# Patient Record
Sex: FEMALE | Race: WHITE | Employment: FULL TIME | URBAN - METROPOLITAN AREA
[De-identification: names, ages, dates, MRNs, and addresses within clinical notes are randomized per-mention and may not be internally consistent; named-entity substitution may affect disease eponyms.]

---

## 2018-09-06 PROBLEM — N76.0 BACTERIAL VAGINAL INFECTION: Status: ACTIVE | Noted: 2018-09-06

## 2018-09-06 PROBLEM — B96.89 BACTERIAL VAGINAL INFECTION: Status: ACTIVE | Noted: 2018-09-06

## 2018-09-06 PROBLEM — R35.0 URINARY FREQUENCY: Status: ACTIVE | Noted: 2018-09-06

## 2018-09-06 PROBLEM — N89.8 VAGINAL ITCHING: Status: ACTIVE | Noted: 2018-09-06

## 2019-04-03 PROBLEM — Z30.09 COUNSELING FOR BIRTH CONTROL REGARDING INTRAUTERINE DEVICE (IUD): Status: ACTIVE | Noted: 2019-04-03

## 2019-05-03 PROBLEM — Z30.430 ENCOUNTER FOR INSERTION OF COPPER IUD: Status: ACTIVE | Noted: 2019-05-03

## 2021-06-04 PROBLEM — N89.8 VAGINAL ITCHING: Status: RESOLVED | Noted: 2018-09-06 | Resolved: 2021-06-04

## 2021-06-04 PROBLEM — Z34.90 PREGNANCY: Status: ACTIVE | Noted: 2021-06-04

## 2021-06-04 PROBLEM — N76.0 BACTERIAL VAGINAL INFECTION: Status: RESOLVED | Noted: 2018-09-06 | Resolved: 2021-06-04

## 2021-06-04 PROBLEM — R35.0 URINARY FREQUENCY: Status: RESOLVED | Noted: 2018-09-06 | Resolved: 2021-06-04

## 2021-06-04 PROBLEM — Z30.430 ENCOUNTER FOR INSERTION OF COPPER IUD: Status: RESOLVED | Noted: 2019-05-03 | Resolved: 2021-06-04

## 2021-06-04 PROBLEM — B96.89 BACTERIAL VAGINAL INFECTION: Status: RESOLVED | Noted: 2018-09-06 | Resolved: 2021-06-04

## 2021-06-04 PROBLEM — Z30.09 COUNSELING FOR BIRTH CONTROL REGARDING INTRAUTERINE DEVICE (IUD): Status: RESOLVED | Noted: 2019-04-03 | Resolved: 2021-06-04

## 2021-12-27 ENCOUNTER — HOSPITAL ENCOUNTER (INPATIENT)
Age: 31
LOS: 3 days | Discharge: HOME | End: 2021-12-30
Attending: OBSTETRICS & GYNECOLOGY | Admitting: OBSTETRICS & GYNECOLOGY
Payer: COMMERCIAL

## 2021-12-27 DIAGNOSIS — U07.1 COVID-19: ICD-10-CM

## 2021-12-27 PROBLEM — Z34.90 ENCOUNTER FOR INDUCTION OF LABOR: Status: ACTIVE | Noted: 2021-12-27

## 2021-12-27 LAB
ERYTHROCYTE [DISTWIDTH] IN BLOOD BY AUTOMATED COUNT: 12.3 % (ref 11.9–14.6)
HCT VFR BLD AUTO: 36.4 % (ref 35.8–46.3)
HGB BLD-MCNC: 12.4 G/DL (ref 11.7–15.4)
MCH RBC QN AUTO: 31.8 PG (ref 26.1–32.9)
MCHC RBC AUTO-ENTMCNC: 34.1 G/DL (ref 31.4–35)
MCV RBC AUTO: 93.3 FL (ref 79.6–97.8)
NRBC # BLD: 0 K/UL (ref 0–0.2)
PLATELET # BLD AUTO: 222 K/UL (ref 150–450)
PMV BLD AUTO: 9.8 FL (ref 9.4–12.3)
RBC # BLD AUTO: 3.9 M/UL (ref 4.05–5.2)
WBC # BLD AUTO: 6.1 K/UL (ref 4.3–11.1)

## 2021-12-27 PROCEDURE — 85027 COMPLETE CBC AUTOMATED: CPT

## 2021-12-27 PROCEDURE — 65270000029 HC RM PRIVATE

## 2021-12-27 PROCEDURE — 74011250636 HC RX REV CODE- 250/636: Performed by: OBSTETRICS & GYNECOLOGY

## 2021-12-27 PROCEDURE — 36415 COLL VENOUS BLD VENIPUNCTURE: CPT

## 2021-12-27 PROCEDURE — 86900 BLOOD TYPING SEROLOGIC ABO: CPT

## 2021-12-27 RX ORDER — LIDOCAINE HYDROCHLORIDE 20 MG/ML
JELLY TOPICAL
Status: ACTIVE | OUTPATIENT
Start: 2021-12-27 | End: 2021-12-28

## 2021-12-27 RX ORDER — SODIUM CHLORIDE 0.9 % (FLUSH) 0.9 %
5-40 SYRINGE (ML) INJECTION AS NEEDED
Status: DISCONTINUED | OUTPATIENT
Start: 2021-12-27 | End: 2021-12-29

## 2021-12-27 RX ORDER — ACETAMINOPHEN 500 MG
1000 TABLET ORAL
Status: DISCONTINUED | OUTPATIENT
Start: 2021-12-27 | End: 2021-12-29

## 2021-12-27 RX ORDER — OXYTOCIN/RINGER'S LACTATE 30/500 ML
87.3 PLASTIC BAG, INJECTION (ML) INTRAVENOUS AS NEEDED
Status: COMPLETED | OUTPATIENT
Start: 2021-12-27 | End: 2021-12-29

## 2021-12-27 RX ORDER — OXYTOCIN/RINGER'S LACTATE 30/500 ML
10 PLASTIC BAG, INJECTION (ML) INTRAVENOUS AS NEEDED
Status: DISCONTINUED | OUTPATIENT
Start: 2021-12-27 | End: 2021-12-29

## 2021-12-27 RX ORDER — SODIUM CHLORIDE 0.9 % (FLUSH) 0.9 %
5-40 SYRINGE (ML) INJECTION EVERY 8 HOURS
Status: DISCONTINUED | OUTPATIENT
Start: 2021-12-27 | End: 2021-12-29

## 2021-12-27 RX ORDER — ZOLPIDEM TARTRATE 5 MG/1
5 TABLET ORAL
Status: DISCONTINUED | OUTPATIENT
Start: 2021-12-27 | End: 2021-12-29

## 2021-12-27 RX ORDER — LIDOCAINE HYDROCHLORIDE 10 MG/ML
1 INJECTION INFILTRATION; PERINEURAL
Status: ACTIVE | OUTPATIENT
Start: 2021-12-27 | End: 2021-12-28

## 2021-12-27 RX ORDER — DIPHENHYDRAMINE HCL 25 MG
25 CAPSULE ORAL
Status: DISCONTINUED | OUTPATIENT
Start: 2021-12-27 | End: 2021-12-29

## 2021-12-27 RX ORDER — CALCIUM CARBONATE 200(500)MG
200 TABLET,CHEWABLE ORAL
Status: DISCONTINUED | OUTPATIENT
Start: 2021-12-27 | End: 2021-12-29

## 2021-12-27 RX ORDER — DEXTROSE, SODIUM CHLORIDE, SODIUM LACTATE, POTASSIUM CHLORIDE, AND CALCIUM CHLORIDE 5; .6; .31; .03; .02 G/100ML; G/100ML; G/100ML; G/100ML; G/100ML
125 INJECTION, SOLUTION INTRAVENOUS CONTINUOUS
Status: DISCONTINUED | OUTPATIENT
Start: 2021-12-27 | End: 2021-12-29

## 2021-12-27 RX ORDER — BUTORPHANOL TARTRATE 2 MG/ML
1 INJECTION INTRAMUSCULAR; INTRAVENOUS
Status: DISCONTINUED | OUTPATIENT
Start: 2021-12-27 | End: 2021-12-29

## 2021-12-27 RX ORDER — MINERAL OIL
120 OIL (ML) ORAL
Status: DISPENSED | OUTPATIENT
Start: 2021-12-27 | End: 2021-12-28

## 2021-12-27 RX ORDER — ONDANSETRON 2 MG/ML
4 INJECTION INTRAMUSCULAR; INTRAVENOUS
Status: DISCONTINUED | OUTPATIENT
Start: 2021-12-27 | End: 2021-12-29

## 2021-12-27 RX ADMIN — SODIUM CHLORIDE, SODIUM LACTATE, POTASSIUM CHLORIDE, CALCIUM CHLORIDE, AND DEXTROSE MONOHYDRATE 125 ML/HR: 600; 310; 30; 20; 5 INJECTION, SOLUTION INTRAVENOUS at 22:18

## 2021-12-28 ENCOUNTER — ANESTHESIA (OUTPATIENT)
Dept: ANTEPARTUM | Age: 31
End: 2021-12-28
Payer: COMMERCIAL

## 2021-12-28 ENCOUNTER — ANESTHESIA EVENT (OUTPATIENT)
Dept: ANTEPARTUM | Age: 31
End: 2021-12-28
Payer: COMMERCIAL

## 2021-12-28 LAB
ABO + RH BLD: NORMAL
BLOOD GROUP ANTIBODIES SERPL: NORMAL
SPECIMEN EXP DATE BLD: NORMAL

## 2021-12-28 PROCEDURE — 2709999900 HC NON-CHARGEABLE SUPPLY

## 2021-12-28 PROCEDURE — 77030014125 HC TY EPDRL BBMI -B: Performed by: ANESTHESIOLOGY

## 2021-12-28 PROCEDURE — 76060000078 HC EPIDURAL ANESTHESIA

## 2021-12-28 PROCEDURE — 74011250636 HC RX REV CODE- 250/636: Performed by: OBSTETRICS & GYNECOLOGY

## 2021-12-28 PROCEDURE — 36415 COLL VENOUS BLD VENIPUNCTURE: CPT

## 2021-12-28 PROCEDURE — 74011250637 HC RX REV CODE- 250/637: Performed by: OBSTETRICS & GYNECOLOGY

## 2021-12-28 PROCEDURE — 74011250636 HC RX REV CODE- 250/636: Performed by: STUDENT IN AN ORGANIZED HEALTH CARE EDUCATION/TRAINING PROGRAM

## 2021-12-28 PROCEDURE — A4300 CATH IMPL VASC ACCESS PORTAL: HCPCS | Performed by: ANESTHESIOLOGY

## 2021-12-28 PROCEDURE — 77030007880 HC KT SPN EPDRL BBMI -B: Performed by: ANESTHESIOLOGY

## 2021-12-28 PROCEDURE — 65270000029 HC RM PRIVATE

## 2021-12-28 PROCEDURE — 3E0P7VZ INTRODUCTION OF HORMONE INTO FEMALE REPRODUCTIVE, VIA NATURAL OR ARTIFICIAL OPENING: ICD-10-PCS | Performed by: OBSTETRICS & GYNECOLOGY

## 2021-12-28 PROCEDURE — 77030019905 HC CATH URETH INTMIT MDII -A

## 2021-12-28 RX ORDER — FENTANYL CITRATE 50 UG/ML
100 INJECTION, SOLUTION INTRAMUSCULAR; INTRAVENOUS ONCE
Status: DISCONTINUED | OUTPATIENT
Start: 2021-12-28 | End: 2021-12-29

## 2021-12-28 RX ORDER — OXYTOCIN/RINGER'S LACTATE 30/500 ML
0-20 PLASTIC BAG, INJECTION (ML) INTRAVENOUS
Status: DISCONTINUED | OUTPATIENT
Start: 2021-12-28 | End: 2021-12-29

## 2021-12-28 RX ORDER — FENTANYL CITRATE 50 UG/ML
INJECTION, SOLUTION INTRAMUSCULAR; INTRAVENOUS
Status: COMPLETED
Start: 2021-12-28 | End: 2021-12-28

## 2021-12-28 RX ORDER — FENTANYL CITRATE 50 UG/ML
INJECTION, SOLUTION INTRAMUSCULAR; INTRAVENOUS AS NEEDED
Status: DISCONTINUED | OUTPATIENT
Start: 2021-12-28 | End: 2021-12-29 | Stop reason: HOSPADM

## 2021-12-28 RX ORDER — ROPIVACAINE HYDROCHLORIDE 2 MG/ML
INJECTION, SOLUTION EPIDURAL; INFILTRATION; PERINEURAL
Status: DISCONTINUED | OUTPATIENT
Start: 2021-12-28 | End: 2021-12-29 | Stop reason: HOSPADM

## 2021-12-28 RX ADMIN — DIPHENHYDRAMINE HCL 25 MG: 25 CAPSULE ORAL at 07:57

## 2021-12-28 RX ADMIN — SODIUM CHLORIDE, SODIUM LACTATE, POTASSIUM CHLORIDE, CALCIUM CHLORIDE, AND DEXTROSE MONOHYDRATE 125 ML/HR: 600; 310; 30; 20; 5 INJECTION, SOLUTION INTRAVENOUS at 22:48

## 2021-12-28 RX ADMIN — ONDANSETRON 4 MG: 2 INJECTION INTRAMUSCULAR; INTRAVENOUS at 16:21

## 2021-12-28 RX ADMIN — ONDANSETRON 4 MG: 2 INJECTION INTRAMUSCULAR; INTRAVENOUS at 21:48

## 2021-12-28 RX ADMIN — Medication 7 MILLI-UNITS/MIN: at 23:38

## 2021-12-28 RX ADMIN — BUTORPHANOL TARTRATE 1 MG: 2 INJECTION, SOLUTION INTRAMUSCULAR; INTRAVENOUS at 14:01

## 2021-12-28 RX ADMIN — Medication 50 MCG: at 04:06

## 2021-12-28 RX ADMIN — Medication 50 MCG: at 00:10

## 2021-12-28 RX ADMIN — FENTANYL CITRATE 15 MCG: 50 INJECTION INTRAMUSCULAR; INTRAVENOUS at 17:07

## 2021-12-28 RX ADMIN — FENTANYL CITRATE 85 MCG: 50 INJECTION INTRAMUSCULAR; INTRAVENOUS at 17:09

## 2021-12-28 RX ADMIN — Medication 50 MCG: at 07:58

## 2021-12-28 RX ADMIN — SODIUM CHLORIDE, SODIUM LACTATE, POTASSIUM CHLORIDE, CALCIUM CHLORIDE, AND DEXTROSE MONOHYDRATE 125 ML/HR: 600; 310; 30; 20; 5 INJECTION, SOLUTION INTRAVENOUS at 15:05

## 2021-12-28 RX ADMIN — ANTACID TABLETS 200 MG: 500 TABLET, CHEWABLE ORAL at 21:36

## 2021-12-28 RX ADMIN — ROPIVACAINE HYDROCHLORIDE 8 ML/HR: 2 INJECTION, SOLUTION EPIDURAL; INFILTRATION at 17:12

## 2021-12-28 RX ADMIN — Medication 1 MILLI-UNITS/MIN: at 20:58

## 2021-12-28 RX ADMIN — Medication 50 MCG: at 13:03

## 2021-12-28 RX ADMIN — SODIUM CHLORIDE, SODIUM LACTATE, POTASSIUM CHLORIDE, CALCIUM CHLORIDE, AND DEXTROSE MONOHYDRATE 125 ML/HR: 600; 310; 30; 20; 5 INJECTION, SOLUTION INTRAVENOUS at 06:04

## 2021-12-28 RX ADMIN — DIPHENHYDRAMINE HCL 25 MG: 25 CAPSULE ORAL at 00:10

## 2021-12-28 RX ADMIN — SODIUM CHLORIDE, SODIUM LACTATE, POTASSIUM CHLORIDE, AND CALCIUM CHLORIDE 500 ML: 600; 310; 30; 20 INJECTION, SOLUTION INTRAVENOUS at 16:51

## 2021-12-28 NOTE — H&P
Mary Grace Rainey  [id number]      History and Physical  Subjective:     Patient is a 33 y.o.  female presents with COVID + only mild symptoms tested positive yesterday after previously testing negative on 2 separate occasions. She states her symptoms are now improved. PEr MFM induce and get delivered ASAP, pt brought in for cytotec induciton. See office notes on prenatal care. Lab Results   Component Value Date/Time    ABO/Rh(D) A POSITIVE 12/27/2021 10:45 PM    Antibody screen, External negative 06/04/2021 12:00 AM    Antibody screen NEG 12/27/2021 10:45 PM    Rubella, External immune 06/04/2021 12:00 AM    HBsAg, External negative 06/04/2021 12:00 AM    HIV, External non reactive 06/04/2021 12:00 AM    RPR, External non reactive 06/04/2021 12:00 AM    Gonorrhea, External negative 06/24/2021 12:00 AM    Chlamydia, External negative 06/24/2021 12:00 AM    ABO,Rh A positive 06/04/2021 12:00 AM               Patient Active Problem List    Diagnosis Date Noted    COVID-19 12/27/2021    Encounter for induction of labor 12/27/2021    Pregnancy 06/04/2021     Past Medical History:   Diagnosis Date    H/O seasonal allergies       History reviewed. No pertinent surgical history. Prior to Admission Medications   Prescriptions Last Dose Informant Patient Reported? Taking?   ascorbic acid (VITAMIN C PO) 12/26/2021 at 2000  Yes Yes   Sig: Take 1,000 mg by mouth daily. cetirizine (ZyrTEC) 10 mg tablet 12/26/2021 at 2000  Yes Yes   Sig: Take  by mouth. cholecalciferol, vitamin D3, (Vitamin D3) 50 mcg (2,000 unit) tab 12/26/2021 at 2000  Yes Yes   Sig: Take  by mouth.   multivit 47/iron/folate 1/dha (PNV-DHA PO) 12/26/2021 at 2000  Yes Yes   Sig: Take  by mouth. Facility-Administered Medications: None     Allergies   Allergen Reactions    Erythromycin Hives and Nausea and Vomiting    Flagyl [Metronidazole] Other (comments)     Intolerance, not a true allergy per pt.   anxiety      Social History Tobacco Use    Smoking status: Never Smoker    Smokeless tobacco: Never Used   Substance Use Topics    Alcohol use: Not Currently      Family History   Problem Relation Age of Onset    Elevated Lipids Father     Heart Attack Father           Objective:     Patient Vitals for the past 8 hrs:   BP Temp Pulse   12/28/21 1215  98.1 °F (36.7 °C)    12/28/21 1015 (!) 99/49  90   12/28/21 0914 109/67  81   12/28/21 0814 101/60  83   12/28/21 0743 (!) 111/56  97   12/28/21 0614 108/70  97        Fetal Monitoring:    Patient Vitals for the past 4 hrs:    Mode Fetal Heart Rate Variability Decelerations Accelerations Non Stress Test   12/28/21 1300 External 120 6-25 BPM Variable Yes Reactive   12/28/21 1200 External 130 6-25 BPM Variable Yes Reactive   12/28/21 1100 External 125 6-25 BPM Variable Yes Reactive   12/28/21 1000 External 130 6-25 BPM Variable Yes Reactive      Uterine Activity: Frequency: Every 3 minutes  PER RN FT- 70/high    Assessment:     Active Problems:    Encounter for induction of labor (12/27/2021)        Plan:     Reassuring fetal status   Symptomatically improved from Covic standpoint denies SOB only mild congestion and cough

## 2021-12-28 NOTE — PROGRESS NOTES
Dr Nessa Daniel called and update on SVE 2/90/-3 and SROM at 1635.  Pt desires epidural. Orders reeived for epidural.

## 2021-12-28 NOTE — PROGRESS NOTES
Alexandria Graham Share at bedside at [phone]. Assisted pt to sitting up on bedside at 1645. Timeout completed at [phone] with MD, AKUA and myself at bedside. Test dose given at 1704. Negative reaction. Dose given at 1705. Pt assisted to lying back in right  tilt position. See anesthesia record for details. See vital sign flow sheet for BP. Tolerated procedure well.

## 2021-12-28 NOTE — ANESTHESIA PREPROCEDURE EVALUATION
Relevant Problems   No relevant active problems       Anesthetic History   No history of anesthetic complications            Review of Systems / Medical History  Patient summary reviewed and pertinent labs reviewed    Pulmonary  Within defined limits                 Neuro/Psych   Within defined limits           Cardiovascular  Within defined limits                Exercise tolerance: >4 METS     GI/Hepatic/Renal     GERD: well controlled           Endo/Other  Within defined limits           Other Findings   Comments: COVID +           Physical Exam    Airway  Mallampati: II  TM Distance: 4 - 6 cm  Neck ROM: normal range of motion   Mouth opening: Normal     Cardiovascular  Regular rate and rhythm,  S1 and S2 normal,  no murmur, click, rub, or gallop             Dental         Pulmonary  Breath sounds clear to auscultation               Abdominal         Other Findings            Anesthetic Plan    ASA: 2  Anesthesia type: CSE      Post-op pain plan if not by surgeon: indwelling epidural catheter, epidural opioid and intrathecal opiates      Anesthetic plan and risks discussed with: Patient and Spouse

## 2021-12-29 LAB
ARTERIAL PATENCY WRIST A: ABNORMAL
ARTERIAL PATENCY WRIST A: ABNORMAL
BASE DEFICIT BLD-SCNC: 1 MMOL/L
BASE DEFICIT BLD-SCNC: 2.2 MMOL/L
HCO3 BLD-SCNC: 25.8 MMOL/L (ref 22–26)
HCO3 BLDV-SCNC: 22.6 MMOL/L (ref 23–28)
O2/TOTAL GAS SETTING VFR VENT: 21 %
O2/TOTAL GAS SETTING VFR VENT: 21 %
PCO2 BLDCO: 39 MMHG (ref 32–68)
PCO2 BLDCO: 49 MMHG (ref 32–68)
PH BLDCO: 7.33 [PH] (ref 7.15–7.38)
PH BLDCO: 7.38 [PH] (ref 7.15–7.38)
PO2 BLDCO: 23 MMHG
PO2 BLDCO: 26 MMHG
SAO2 % BLD: 34.3 % (ref 95–98)
SAO2 % BLDV: 47.1 % (ref 65–88)
SERVICE CMNT-IMP: ABNORMAL
SERVICE CMNT-IMP: ABNORMAL
SPECIMEN TYPE: ABNORMAL
SPECIMEN TYPE: ABNORMAL

## 2021-12-29 PROCEDURE — 75410000003 HC RECOV DEL/VAG/CSECN EA 0.5 HR: Performed by: OBSTETRICS & GYNECOLOGY

## 2021-12-29 PROCEDURE — 2709999900 HC NON-CHARGEABLE SUPPLY

## 2021-12-29 PROCEDURE — 74011250637 HC RX REV CODE- 250/637: Performed by: OBSTETRICS & GYNECOLOGY

## 2021-12-29 PROCEDURE — 74011250636 HC RX REV CODE- 250/636: Performed by: OBSTETRICS & GYNECOLOGY

## 2021-12-29 PROCEDURE — 82803 BLOOD GASES ANY COMBINATION: CPT

## 2021-12-29 PROCEDURE — 75410000000 HC DELIVERY VAGINAL/SINGLE: Performed by: OBSTETRICS & GYNECOLOGY

## 2021-12-29 PROCEDURE — 75410000002 HC LABOR FEE PER 1 HR: Performed by: OBSTETRICS & GYNECOLOGY

## 2021-12-29 PROCEDURE — 59400 OBSTETRICAL CARE: CPT | Performed by: OBSTETRICS & GYNECOLOGY

## 2021-12-29 PROCEDURE — 65270000029 HC RM PRIVATE

## 2021-12-29 RX ORDER — OXYCODONE AND ACETAMINOPHEN 5; 325 MG/1; MG/1
1 TABLET ORAL
Status: DISCONTINUED | OUTPATIENT
Start: 2021-12-29 | End: 2021-12-30 | Stop reason: HOSPADM

## 2021-12-29 RX ORDER — IBUPROFEN 400 MG/1
400 TABLET ORAL
Status: DISCONTINUED | OUTPATIENT
Start: 2021-12-29 | End: 2021-12-30 | Stop reason: HOSPADM

## 2021-12-29 RX ORDER — NALOXONE HYDROCHLORIDE 0.4 MG/ML
0.4 INJECTION, SOLUTION INTRAMUSCULAR; INTRAVENOUS; SUBCUTANEOUS AS NEEDED
Status: DISCONTINUED | OUTPATIENT
Start: 2021-12-29 | End: 2021-12-30 | Stop reason: HOSPADM

## 2021-12-29 RX ORDER — OXYCODONE AND ACETAMINOPHEN 7.5; 325 MG/1; MG/1
2 TABLET ORAL
Status: DISCONTINUED | OUTPATIENT
Start: 2021-12-29 | End: 2021-12-30 | Stop reason: HOSPADM

## 2021-12-29 RX ORDER — DIPHENHYDRAMINE HCL 25 MG
25 CAPSULE ORAL
Status: DISCONTINUED | OUTPATIENT
Start: 2021-12-29 | End: 2021-12-30 | Stop reason: HOSPADM

## 2021-12-29 RX ORDER — SIMETHICONE 80 MG
80 TABLET,CHEWABLE ORAL
Status: DISCONTINUED | OUTPATIENT
Start: 2021-12-29 | End: 2021-12-30 | Stop reason: HOSPADM

## 2021-12-29 RX ORDER — ZOLPIDEM TARTRATE 5 MG/1
5 TABLET ORAL
Status: DISCONTINUED | OUTPATIENT
Start: 2021-12-29 | End: 2021-12-30 | Stop reason: HOSPADM

## 2021-12-29 RX ADMIN — Medication 1 SPRAY: at 16:36

## 2021-12-29 RX ADMIN — WITCH HAZEL 1 PAD: 500 SOLUTION RECTAL; TOPICAL at 16:37

## 2021-12-29 RX ADMIN — Medication 9 MILLI-UNITS/MIN: at 00:28

## 2021-12-29 RX ADMIN — IBUPROFEN 400 MG: 400 TABLET, FILM COATED ORAL at 16:37

## 2021-12-29 RX ADMIN — IBUPROFEN 400 MG: 400 TABLET, FILM COATED ORAL at 21:03

## 2021-12-29 RX ADMIN — IBUPROFEN 400 MG: 400 TABLET, FILM COATED ORAL at 12:35

## 2021-12-29 RX ADMIN — IBUPROFEN 400 MG: 400 TABLET, FILM COATED ORAL at 04:46

## 2021-12-29 RX ADMIN — Medication 87.3 MILLI-UNITS/MIN: at 01:43

## 2021-12-29 RX ADMIN — IBUPROFEN 400 MG: 400 TABLET, FILM COATED ORAL at 08:22

## 2021-12-29 NOTE — LACTATION NOTE
This note was copied from a baby's chart. In to see mom and infant for first time. Mom's first baby. Baby has latched a few times and fed. Mom's left nipple is inverted at rest so RN earlier today set up pump for mom to use to nick or pump as needed if baby not latching well. Tried w/ mom on both breasts. Baby fed well for 10 minutes on left side. Reviewed signs of deep latch and nutritive sucking. Would not feed longer despite stimulation and trying right breast as well. Burped infant after. Encouraged mom to pump after any poor/fair feeds for 15 minutes. Set up pump. Kept her to size 27 mm on right nipple as larger and oblong shaped. Changed back to size 24 mm on left nipple as smaller and inverted at rest. Mom pumped x 15 minutes and got 15 mls of thick, yellow colostrum. Showed parents how to draw up in curve tip syringe and finger feed back to infant. Dad return demonstrated and did well, as well as baby. Dad to burp infant after. Wrote feeding plan in room for guidance. Encouraged to give back unused 10 mls of milk between next to feeds, even if breast feeds well at them, so does not got to waste. Reviewed benefits of breast milk. Discussed 2nd night of life/normalcy of periods of cluster feeding. Answered parents questions. No further needs at this time.

## 2021-12-29 NOTE — LACTATION NOTE

## 2021-12-29 NOTE — PROGRESS NOTES
Post-Partum Day Number 0 Progress Note    Patient doing well post-partum without significant complaint. Voiding withour difficulty, normal lochia. Vitals:  Patient Vitals for the past 8 hrs:   BP Temp Pulse Resp   21 0435 (!) 106/53 98.7 °F (37.1 °C) 89 18   21 0331 (!) 103/57  80    21 0317 (!) 100/54  82    21 0202 116/61 99.1 °F (37.3 °C) 78 17   21 0146 (!) 120/56  82    21 0132 (!) 117/56  90    21 0116 (!) 104/51  87    21 0101 (!) 110/55  82    21 0047 (!) 114/58  85      Temp (24hrs), Av.5 °F (36.9 °C), Min:97.8 °F (36.6 °C), Max:99.1 °F (37.3 °C)      Vital signs stable, afebrile. Exam:  Patient without distress. Abdomen soft, fundus firm at level of umbilicus, nontender               Perineum with normal lochia noted. Lower extremities are negative for swelling, cords or tenderness. Labs:   Recent Results (from the past 24 hour(s))   BLOOD GAS, CORD BLOOD    Collection Time: 21  1:21 AM   Result Value Ref Range    FIO2 (POC) 21 %    pH, cord blood (POC) 7.33 7.15 - 7.38      pCO2 cord blood 49 32 - 68 mmHg    pO2 cord blood 23 mmHg    HCO3 (POC) 25.8 22 - 26 MMOL/L    sO2 (POC) 34.3 (L) 95 - 98 %    Base deficit (POC) 1.0 mmol/L    Allens test (POC) NOT APPLICABLE      Specimen type (POC) ARTERIAL CORD      Performed by Kathleen)RT    BLOOD GAS, CORD BLOOD    Collection Time: 21  1:24 AM   Result Value Ref Range    FIO2 (POC) 21 %    pH, cord blood (POC) 7.38 7.15 - 7.38      pCO2 cord blood 39 32 - 68 mmHg    pO2 cord blood 26 mmHg    HCO3, venous (POC) 22.6 (L) 23 - 28 MMOL/L    sO2, venous (POC) 47.1 (L) 65 - 88 %    Base deficit (POC) 2.2 mmol/L    Allens test (POC) NOT APPLICABLE      Specimen type (POC) VENOUS CORD      Performed by Kathleen)RT        Assessment and Plan:  Patient appears to be having uncomplicated post-partum course. Continue routine perineal care and maternal education. Plan discharge tomorrow if no problems occur.

## 2021-12-29 NOTE — ANESTHESIA POSTPROCEDURE EVALUATION
* No procedures listed *. CSE    Anesthesia Post Evaluation      Multimodal analgesia: multimodal analgesia used between 6 hours prior to anesthesia start to PACU discharge  Patient location during evaluation: bedside  Patient participation: complete - patient participated  Level of consciousness: awake and alert  Pain score: 0  Pain management: adequate  Airway patency: patent  Anesthetic complications: no  Cardiovascular status: acceptable and hemodynamically stable  Respiratory status: acceptable and spontaneous ventilation  Hydration status: acceptable  Post anesthesia nausea and vomiting:  none  Final Post Anesthesia Temperature Assessment:  Normothermia (36.0-37.5 degrees C)      INITIAL Post-op Vital signs:   Vitals Value Taken Time   /56 12/29/21 0146   Temp     Pulse 82 12/29/21 0146   Resp     SpO2     Vitals shown include unvalidated device data.

## 2021-12-29 NOTE — PROGRESS NOTES
Admission assessment complete as noted. Patient oriented to room and unit. Plan of care reviewed and patient verbalizes understanding. Questions encouraged and answered. Patent encouraged to call for needs or concerns. Safety Teaching reviewed:   1. Hand hygiene prior to handling the infant. 2. Use of bulb syringe. 3. Bracelets with matching numbers are placed on mother and infant  4. An infant security tag  [facility]) is placed on the infant's ankle and monitored  5. All OB nurses wear pink Employee badges - do not give your baby to anyone without proper identification. 6. Never leave the baby alone in the room. 7. The infant should be placed on their back to sleep. on a firm mattress. No toys should be placed in the crib. (safe sleep video offered to view)  8. Never shake the baby (video offered to view)  9. Infant fall prevention - do not sleep with the baby, and place the baby in the crib while ambulating. 8. Mother and Baby Care booklet given to Mother.

## 2021-12-29 NOTE — PROGRESS NOTES
SBAR IN Report: Mother    Verbal report received from Anika Guardado RN (full name & credentials) on this patient, who is now being transferred from L&D (unit) for routine progression of care. The patient is not wearing a green \"Anesthesia-Duramorph\" band. Report consisted of patient's Situation, Background, Assessment and Recommendations (SBAR).  ID bands were compared with the identification form, and verified with the patient and transferring nurse. Information from the SBAR, Intake/Output, MAR and Quality Measures and the [place] Report was reviewed with the transferring nurse; opportunity for questions and clarification provided.

## 2021-12-29 NOTE — PROGRESS NOTES
Baby Nurse Note    Attended delivery as baby nurse. Viable babyGIRL born @18*. Apgars 8&8. ID bands verified and and placed on infant. Mother plans to breastfeed. Encouraged early skin-to-skin with mother. Last set of vitals at [address] Cord clamp is secure. Report given and left care of baby to Yelena Acosta RN.

## 2021-12-29 NOTE — L&D DELIVERY NOTE
Delivery Summary    Patient: Kristin Veloz MRN: [id number]  SSN: [id number]    YOB: 1990  Age: 33 y.o. Sex: female       Information for the patient's :  Graciela Belling Girl Yessy Casasman [[id number]]       Labor Events:    Labor: No    Steroids: None   Cervical Ripening Date/Time:       Cervical Ripening Type: Misoprostol   Antibiotics During Labor:     Rupture Identifier:      Rupture Date/Time: 2021 4:35 PM   Rupture Type: SROM   Amniotic Fluid Volume:      Amniotic Fluid Description: Clear    Amniotic Fluid Odor: None    Induction: Prostaglandins       Induction Date/Time: 2021 12:01 AM    Indications for Induction: Other(comment)    Augmentation: Oxytocin   Augmentation Date/Time:      Indications for Augmentation: Ineffective Contraction Pattern   Labor complications: None       Additional complications:        Delivery Events:  Indications For Episiotomy:     Episiotomy:     Perineal Laceration(s): 2nd   Repaired: Yes   Periurethral Laceration Location:      Repaired:     Labial Laceration Location:     Repaired:     Sulcal Laceration Location:     Repaired:     Vaginal Laceration Location:     Repaired:     Cervical Laceration Location:     Repaired:     Repair Suture: Chromic 2-0   Number of Repair Packets:     Estimated Blood Loss (ml):  ml   Quantitative Blood Loss (ml)                Delivery Date: 2021    Delivery Time: 1:06 AM  Delivery Type: Vaginal, Spontaneous  Sex:  Female    Gestational Age: 37w11d   Delivery Clinician:  Giovanna Daigle  Living Status: Living   Delivery Location: L&D 437          APGARS  One minute Five minutes Ten minutes   Skin color: 0   0        Heart rate: 2   2        Grimace: 2   2        Muscle tone: 2   2        Breathin   2        Totals: 8   8            Presentation: Vertex    Position: Right Occiput Anterior  Resuscitation Method:  Suctioning-bulb; Tactile Stimulation     Meconium Stained: None      Cord Information: 3 Vessels  Complications: None  Cord around:    Delayed cord clamping? Yes  Cord clamped date/time:2021  1:07 AM  Disposition of Cord Blood: Lab    Blood Gases Sent?: Yes    Placenta:  Date/Time: 2021  1:11 AM  Removal: Spontaneous      Appearance: Normal      Measurements:  Birth Weight:        Birth Length:        Head Circumference:        Chest Circumference:       Abdominal Girth: Other Providers:   REGIS CHASE;BRIAN MOSHER;;IVONNE WOODS;;;;STALIN MORALEZ;JUNI LEE, Obstetrician;Primary Nurse;Primary [place] Nurse;Scrub Tech;Neonatologist;Anesthesiologist;Crna; Charge Nurse;Staff Nurse           Group B Strep: No results found for: GRBSEXT, GRBSEXT  Information for the patient's :  Radha Kenny [[id number]]   No results found for: ABORH, PCTABR, PCTDIG, BILI, ABORHEXT, ABORH     No results for input(s): PCO2CB, PO2CB, HCO3I, SO2I, IBD, PTEMPI, SPECTI, PHICB, ISITE, IDEV, IALLEN in the last 72 hours. Pt pushing with poor effort secondary to exhaustion, offered vacuum declined, continued pushing with epidural turned down. With improved effort  over 2nd laceration TIMBO no shoulder dystocia, Placenta expressed, infant to maternal chest with delayed cord clamping. Repair in usual fashion with excellent hemostasis and cosmesis.  R-v exam normal.

## 2021-12-29 NOTE — PROGRESS NOTES
Care Management Interventions  Mode of Transport at Discharge: Other (see comment) (family)  Transition of Care Consult (CM Consult): Discharge Planning  Support Systems: Spouse/Significant Other  Confirm Follow Up Transport: Family  Discharge Location  Discharge Placement: Home  Chart reviewed - first time parent. CM met with patient while social distancing w/appropriate PPE. CM provided education on [facility] Postpartum  Home Visit Program. Family declined referral for home visit. They have contact information if decides later would like referral. Patient denies history of depression. Patient given informational packet on  mood & anxiety disorders (resources/education). Family denies any additional needs from Case Management at this time. Family has / 's contact information should any needs/questions arise.

## 2021-12-30 VITALS
OXYGEN SATURATION: 99 % | DIASTOLIC BLOOD PRESSURE: 66 MMHG | RESPIRATION RATE: 16 BRPM | BODY MASS INDEX: 31.8 KG/M2 | SYSTOLIC BLOOD PRESSURE: 112 MMHG | WEIGHT: 162 LBS | HEART RATE: 72 BPM | TEMPERATURE: 97.7 F | HEIGHT: 60 IN

## 2021-12-30 PROCEDURE — 74011250637 HC RX REV CODE- 250/637: Performed by: OBSTETRICS & GYNECOLOGY

## 2021-12-30 PROCEDURE — 2709999900 HC NON-CHARGEABLE SUPPLY

## 2021-12-30 RX ADMIN — IBUPROFEN 400 MG: 400 TABLET, FILM COATED ORAL at 02:33

## 2021-12-30 RX ADMIN — IBUPROFEN 400 MG: 400 TABLET, FILM COATED ORAL at 17:20

## 2021-12-30 RX ADMIN — IBUPROFEN 400 MG: 400 TABLET, FILM COATED ORAL at 12:42

## 2021-12-30 NOTE — LACTATION NOTE
This note was copied from a baby's chart. In to see mom and infant for follow up. Mom states overall infant has been latching and nursing well. Gave her feeding plan just in case needed at home for guidance if not feeding well. Mom has pumped around 10 mls several times when did need to pump. Knows can supplement as needed. Reviewed discharge instructions and how to manage period of engorgement. Gave her printed instructions for personal pump at home. Encouraged to call w/ any lactation needs in the future. No further needs at this time.

## 2021-12-30 NOTE — PROGRESS NOTES
Post-Delivery Day Number 1 and 1/2 Progress Note    Patient doing well post-delivery day 1 and 1/2 from vaginal delivery without significant complaints. Pain controlled on current medication. Tolerating diet. Ambulating/Voiding without difficulty, normal lochia. Vitals:  Blood pressure 108/69, pulse 78, temperature 97.6 °F (36.4 °C), resp. rate 16, height 5' (1.524 m), weight 162 lb (73.5 kg), last menstrual period 03/25/2021, SpO2 98 %, unknown if currently breastfeeding. Vital signs stable, afebrile. Exam:  Patient without distress. Abdomen soft, fundus firm at level of umbilicus, nontender. Lower extremities are negative for swelling, cords or tenderness. Lochia- moderate    Labs: No lab exists for component: HBG    Assessment and Plan:  Patient appears to be having uncomplicated post-vaginal delivery course. Continue routine post-op care and maternal education. Baby is almost 39 hours old. Mom is doing well. Only loss of smell. Eating/drinking ok. Baby will need additional PCR test candaceor COVID before d/c.

## 2021-12-30 NOTE — PROGRESS NOTES
Baby now with orders for early d/c today. Dr. Mercedes Landon updated and okay to d/c now and follow up in 2 weeks.

## 2021-12-30 NOTE — DISCHARGE INSTRUCTIONS
Vaginal Childbirth: Care Instructions  Overview     Vaginal birth means delivering a baby through the birth canal (vagina). During labor, the uterus tightens (contracts) regularly to thin and open the cervix and to push the baby out through the birth canal.  Your body will slowly heal in the next few weeks. It's easy to get too tired and overwhelmed during the first weeks after your baby is born. Changes in your hormones can shift your mood without warning. You may find it hard to meet the extra demands on your energy and time. Take it easy on yourself. Follow-up care is a key part of your treatment and safety. Be sure to make and go to all appointments, and call your doctor if you are having problems. It's also a good idea to know your test results and keep a list of the medicines you take. How can you care for yourself at home? Vaginal bleeding and cramps  · After delivery, you will have a bloody discharge from your vagina. This will turn pink within a week and then white or yellow after about 10 days. It may last for 2 to 4 weeks or longer, until the uterus has healed. Use sanitary pads until you stop bleeding. Using pads makes it easier to monitor your bleeding. · Don't worry if you pass some blood clots, as long as they are smaller than a golf ball. If you have a tear or stitches in your vaginal area, change the pad at least every 4 hours. This will help prevent soreness and infection. · You may have cramps for the first few days after childbirth. These are normal and occur as the uterus shrinks to normal size. Take an over-the-counter pain medicine, such as acetaminophen (Tylenol), ibuprofen (Advil, Motrin), or naproxen (Aleve), for cramps. Read and follow all instructions on the label. Do not take aspirin, because it can cause more bleeding. · Do not take two or more pain medicines at the same time unless the doctor told you to. Many pain medicines have acetaminophen, which is Tylenol.  Too much acetaminophen (Tylenol) can be harmful. Stitches  · If you have stitches, they will dissolve on their own and don't need to be removed. Follow your doctor's instructions for cleaning the stitched area. · Put ice or a cold pack on your painful area for 10 to 20 minutes at a time, several times a day, for the first few days. Put a thin cloth between the ice and your skin. · Sit in a few inches of warm water (sitz bath) 3 times a day and after bowel movements. The warm water helps with pain and itching. If you don't have a tub, a warm shower might help. Breast fullness  · Your breasts may overfill (engorge) in the first few days after delivery. To help milk flow and to relieve pain, warm your breasts in the shower or by using warm, moist towels before nursing. · If you aren't nursing, don't put warmth on your breasts or touch your breasts. Wear a bra that fits well and use ice until the fullness goes away. This usually takes 2 to 3 days. · Put ice or a cold pack on your breast after nursing to reduce swelling and pain. Put a thin cloth between the ice and your skin. Activity  · Eat a balanced diet. Don't try to lose weight by cutting calories. Keep taking your prenatal vitamins, or take a multivitamin. · Get as much rest as you can. Try to take naps when your baby sleeps during the day. · Get some exercise every day. But don't do any heavy exercise until your doctor says it is okay. · Wait until you are healed (about 4 to 6 weeks) before you have sexual intercourse. Your doctor will tell you when it is okay to have sex. · If you don't want to get pregnant, talk to your doctor about birth control. You can get pregnant even before your period returns. Also, you can get pregnant while you are breastfeeding. Mental health  · It's normal to have some sadness, anxiety, sleeplessness, and mood swings after you go home.  If you feel upset or hopeless for more than a few days or are having trouble doing the things you need to do, talk to your doctor. Constipation and hemorrhoids  · Drink plenty of fluids. If you have kidney, heart, or liver disease and have to limit fluids, talk with your doctor before you increase the amount of fluids you drink. · Eat plenty of fiber each day. Have a bran muffin or bran cereal for breakfast. Try eating a piece of fruit for a mid-afternoon snack. · For painful, itchy hemorrhoids, put ice or a cold pack on the area several times a day for 10 minutes at a time. Follow this by putting a warm compress on the area for another 10 to 20 minutes or by sitting in a shallow, warm bath. When should you call for help? Call 911  anytime you think you may need emergency care. For example, call if:    · You have thoughts of harming yourself, your baby, or another person.     · You passed out (lost consciousness).     · You have chest pain, are short of breath, or cough up blood.     · You have a seizure. Call your doctor now or seek immediate medical care if:    · You have severe vaginal bleeding.     · You are dizzy or lightheaded, or you feel like you may faint.     · You have a fever.     · You have new or more pain in your belly or pelvis.     · You have symptoms of a blood clot in your leg (called a deep vein thrombosis), such as:  ? Pain in the calf, back of the knee, thigh, or groin. ? Redness and swelling in your leg or groin.     · You have signs of preeclampsia, such as:  ? Sudden swelling of your face, hands, or feet. ? New vision problems (such as dimness, blurring, or seeing spots). ? A severe headache. Watch closely for changes in your health, and be sure to contact your doctor if:    · Your vaginal bleeding seems to be getting heavier.     · You have new or worse vaginal discharge.     · You feel sad, anxious, or hopeless for more than a few days.     · You do not get better as expected. Where can you learn more?   Go to http://www.gray.com/  Enter D084 in the search box to learn more about \"Vaginal Childbirth: Care Instructions. \"  Current as of: June 16, 2021               Content Version: 13.0  © 2006-2021 [employer]. Care instructions adapted under license by [employer] (which disclaims liability or warranty for this information). If you have questions about a medical condition or this instruction, always ask your healthcare professional. [place] [address] any warranty or liability for your use of this information.

## 2021-12-30 NOTE — LACTATION NOTE
This note was copied from a baby's chart. Individualized Feeding Plan for Breastfeeding   Lactation Services [phone]      As much as possible, hold your baby on your chest so babys bare skin is against your bare skin with a blanket covering babys back, especially 30 minutes before it is time for baby to eat. Watch for early feeding cues such as, licking lips, sucking motions, rooting, hands to mouth. Crying is a late feeding cue. Feed your baby at least 8 times in 24 hours, or more if your baby is showing feeding cues. If baby is sleepy put baby skin to skin and watch for hunger cues. To rouse baby: unwrap, undress, massage hands, feet, & back, change diaper, gently change babys position from lying to sitting. 15-20 minutes on the first breast of active breastfeeding is considered a good feeding. Good, active breastfeeding is when baby is alert, tugging the nipple, their ear may move, and you can hear swallows. Allow baby to finish the first side before changing sides. Sleeping at the breast or only brief, light sucks should not be considered a good, full breastfeed. At each feedin.  Baby needs to NURSE WELL x 15-20 minutes on at least first breast, burp and offer 2nd breast at every feeding. If no sustained latch only attempt at breast for 10 minutes. If baby does not latch on and feed well on at least one side, you should:            2. Double pump for 15 minutes with breast massage and compression. Hand express for an additional 2-3 minutes per side. Pump after each feeding attempt or poor feeding, up to 8 times per day. If you are not putting baby to the breast you need to pump 8 times a day. Pump every 3 hours. 3. Give baby all of the breast milk you obtain using a straight syringe or  curved syringe.     If baby does NOT have enough wet and dirty diapers per day, is jaundiced/lethargic, or has significant weight loss AND you do NOT pump enough milk for each feeding (per volume listed below), formula supplementation may need to be used. Call lactation department /pediatrician if you have concerns. AVERAGE INTAKES OF COLOSTRUM BY HEALTHY  INFANTS:  Time  Day Intake (ml per feeding)  Based on 8 feedings per day. 1st 24 hrs  1 2-10 ml  24-48 hrs  2 5-15 ml  48-72 hrs  3 15-30 ml (0.5-1 oz)  72-96 hrs  4 30-45 ml (1-1.5oz)                          5-6      60-75 ml (2-2.5oz)                           7        75-90 ml (2.5-3oz)    By day 7, baby will need 80 ml or 2.75 oz at each feeding based on 8 feedings per day & babys weight. (1oz = 30ml). Total milk volume needed in 24 hours by Day 7 is 22 oz per day based on baby's birthweight of 8lb 1oz. The more often baby eats, the less volume they need per feeding. If baby is eating more often than the minimum of 8 times per day, they may take less per feeding. Comments:   Use plan as needed if not latching well. If giving formula after a breast feed, will also need to pump for 10 minutes. If baby does not latch and you just bottle feed, pump for 15 minutes. If pumping, suggest using olive oil or coconut oil on your nipples before pumping to help reduce the friction. Use feeding plan until follow up with pediatrician. Continue to attempt at the breast for most feeds. Pump every 3 hours if no latch. Give all pumped colostrum/breastmilk at each feeding. OUTPATIENT APPOINTMENT Suggested. Outpatient services are located on the 4th floor at [address] [place] [address]. Check in at the 4th floor registration desk (the same one you used when you came to have your baby).   Call for questions [phone]

## 2021-12-30 NOTE — LACTATION NOTE

## 2022-03-19 PROBLEM — Z34.90 PREGNANCY: Status: ACTIVE | Noted: 2021-06-04

## 2022-03-19 PROBLEM — U07.1 COVID-19: Status: ACTIVE | Noted: 2021-12-27

## 2022-03-20 PROBLEM — Z34.90 ENCOUNTER FOR INDUCTION OF LABOR: Status: ACTIVE | Noted: 2021-12-27

## 2023-02-15 ENCOUNTER — OFFICE VISIT (OUTPATIENT)
Dept: OBGYN CLINIC | Age: 33
End: 2023-02-15
Payer: COMMERCIAL

## 2023-02-15 VITALS
DIASTOLIC BLOOD PRESSURE: 64 MMHG | BODY MASS INDEX: 28.16 KG/M2 | HEIGHT: 60 IN | WEIGHT: 143.4 LBS | SYSTOLIC BLOOD PRESSURE: 106 MMHG

## 2023-02-15 DIAGNOSIS — Z13.89 SCREENING FOR GENITOURINARY CONDITION: ICD-10-CM

## 2023-02-15 DIAGNOSIS — Z11.51 SPECIAL SCREENING EXAMINATION FOR HUMAN PAPILLOMAVIRUS (HPV): ICD-10-CM

## 2023-02-15 DIAGNOSIS — N89.8 VAGINAL DISCHARGE: ICD-10-CM

## 2023-02-15 DIAGNOSIS — Z01.419 WELL WOMAN EXAM: Primary | ICD-10-CM

## 2023-02-15 LAB
BACTERIA, WET MOUNT, POC: NEGATIVE
BILIRUBIN, URINE, POC: NEGATIVE
BLOOD URINE, POC: NEGATIVE
CLUE CELLS, WET MOUNT, POC: PRESENT
GLUCOSE URINE, POC: NEGATIVE
KETONES, URINE, POC: NEGATIVE
LEUKOCYTE ESTERASE, URINE, POC: NEGATIVE
NITRITE, URINE, POC: NEGATIVE
PH, URINE, POC: 6 (ref 4.6–8)
PROTEIN,URINE, POC: NEGATIVE
RBC WET MOUNT, POC: NEGATIVE
SPECIFIC GRAVITY, URINE, POC: 1.02 (ref 1–1.03)
TRICH, WET MOUNT, POC: NORMAL
URINALYSIS CLARITY, POC: CLEAR
URINALYSIS COLOR, POC: YELLOW
UROBILINOGEN, POC: NORMAL
WBC, WET MOUNT, POC: NEGATIVE
YEAST, WET MOUNT, POC: NORMAL

## 2023-02-15 PROCEDURE — 99395 PREV VISIT EST AGE 18-39: CPT | Performed by: OBSTETRICS & GYNECOLOGY

## 2023-02-15 PROCEDURE — 87210 SMEAR WET MOUNT SALINE/INK: CPT | Performed by: OBSTETRICS & GYNECOLOGY

## 2023-02-15 PROCEDURE — 81002 URINALYSIS NONAUTO W/O SCOPE: CPT | Performed by: OBSTETRICS & GYNECOLOGY

## 2023-02-15 RX ORDER — FLUCONAZOLE 150 MG/1
TABLET ORAL
Qty: 2 TABLET | Refills: 0 | Status: SHIPPED | OUTPATIENT
Start: 2023-02-15

## 2023-02-15 ASSESSMENT — PATIENT HEALTH QUESTIONNAIRE - PHQ9
SUM OF ALL RESPONSES TO PHQ QUESTIONS 1-9: 0
2. FEELING DOWN, DEPRESSED OR HOPELESS: 0
SUM OF ALL RESPONSES TO PHQ9 QUESTIONS 1 & 2: 0
1. LITTLE INTEREST OR PLEASURE IN DOING THINGS: 0
SUM OF ALL RESPONSES TO PHQ QUESTIONS 1-9: 0

## 2023-02-15 NOTE — PROGRESS NOTES
HPI: Ms. Agueda Norman is a 27 y.o. female  who is here today for a well woman exam. She complains of mild vaginal discomfort within the past few days. She is breastfeeding, having periods. She is  using condoms for birth control. .  her  will be 38 this year. He likely will have a vasectomy. Date Performed Result   PAP 21 Wnl, hpv neg, cultures neg   Mammogram never na   Colonoscopy never na   Dexa never na       OB History          1    Para   1    Term   1       0    AB   0    Living   1         SAB        IAB        Ectopic        Molar        Multiple        Live Births   1            Baby is 1. GYN History     Patient's last menstrual period was 2023. Cycle Length 28 Lasting 5  trace positive dysmenorrhea; negative postcoital bleeding        Past Medical History:   Diagnosis Date    H/O seasonal allergies      No past surgical history on file. Allergies   Allergen Reactions    Metronidazole Other (See Comments)     Intolerance, not a true allergy per pt. anxiety    Erythromycin Hives and Nausea And Vomiting       Current Outpatient Medications   Medication Sig Dispense Refill    UNABLE TO FIND       Docosahexaenoic Acid (DHA PO) Take by mouth      fluconazole (DIFLUCAN) 150 MG tablet Take 1 tablet now and repeat in 3 days 2 tablet 0    cetirizine (ZYRTEC) 10 MG tablet Take by mouth      Cholecalciferol 50 MCG ( UT) TABS Take by mouth       No current facility-administered medications for this visit.          Social History     Socioeconomic History    Marital status:  - 5 years      Spouse name: Not on file    Number of children: Not on file    Years of education: Not on file    Highest education level: Not on file   Occupational History    Not on file   Tobacco Use    Smoking status: Never    Smokeless tobacco: Never   Vaping Use    Vaping Use: Never used   Substance and Sexual Activity    Alcohol use: Not Currently    Drug use: Never    Sexual activity: Yes     Partners: Male     Birth control/protection: None   Other Topics Concern    Exercise:  walking some    Social History Narrative    Not on file     Social Determinants of Health     Financial Resource Strain: Not on file   Food Insecurity: Not on file   Transportation Needs: Not on file   Physical Activity: Not on file   Stress: Not on file   Social Connections: Not on file   Intimate Partner Violence: Not on file   Housing Stability: Not on file     Family History   Problem Relation Age of Onset    Elevated Lipids Father     Heart Attack Father - 61         Review of Systems   Genitourinary:         Vaginal discomfort   All other systems reviewed and are negative. /64 (Site: Right Upper Arm, Position: Sitting)   Ht 5' (1.524 m)   Wt 143 lb 6.4 oz (65 kg)   LMP 02/05/2023   Breastfeeding Yes   BMI 28.01 kg/m²      Physical Exam  Constitutional:       General: She is not in acute distress. Appearance: She is well-developed. HENT:      Head: Normocephalic and atraumatic. Neck:      Thyroid: No thyroid mass or thyromegaly. Cardiovascular:      Rate and Rhythm: Normal rate and regular rhythm. Pulmonary:      Effort: Pulmonary effort is normal.      Breath sounds: Normal breath sounds. Chest:   Breasts:     Right: Normal. No mass or skin change. Left: Normal. No mass or skin change. Abdominal:      General: There is no distension. Palpations: Abdomen is soft. Tenderness: There is no abdominal tenderness. Genitourinary:     General: Normal vulva. Labia:         Right: No rash or lesion. Left: No rash or lesion. Vagina: Normal.      Cervix: Normal.      Uterus: Normal. Not enlarged. Adnexa: Right adnexa normal and left adnexa normal.   Musculoskeletal:      Cervical back: Normal range of motion and neck supple. Neurological:      General: No focal deficit present. Mental Status: She is alert.    Psychiatric:         Attention and Perception: Attention normal.         Mood and Affect: Mood and affect normal.     Wet prep with hyphae seen   Assessment/Plan  Shin Vasquez was seen today for annual exam.    Diagnoses and all orders for this visit:    Well woman exam  -     PAP IG, Aptima HPV and rfx 16/18,45 ([id number])    Screening for genitourinary condition  -     AMB POC URINALYSIS DIP STICK MANUAL W/O MICRO    Special screening examination for human papillomavirus (HPV)  -     PAP IG, Aptima HPV and rfx 16/18,45 (200719)    Vaginal discharge  -     AMB POC SMEAR, STAIN & INTERPRET, WET [place]    Other orders  -     fluconazole (DIFLUCAN) 150 MG tablet;  Take 1 tablet now and repeat in 3 days       Return in about 1 year (around 2/15/2024) for annual exam.   Juan Burroughs D.O

## 2024-05-31 SDOH — ECONOMIC STABILITY: HOUSING INSECURITY
IN THE LAST 12 MONTHS, WAS THERE A TIME WHEN YOU DID NOT HAVE A STEADY PLACE TO SLEEP OR SLEPT IN A SHELTER (INCLUDING NOW)?: NO

## 2024-05-31 SDOH — ECONOMIC STABILITY: FOOD INSECURITY: WITHIN THE PAST 12 MONTHS, THE FOOD YOU BOUGHT JUST DIDN'T LAST AND YOU DIDN'T HAVE MONEY TO GET MORE.: NEVER TRUE

## 2024-05-31 SDOH — ECONOMIC STABILITY: INCOME INSECURITY: HOW HARD IS IT FOR YOU TO PAY FOR THE VERY BASICS LIKE FOOD, HOUSING, MEDICAL CARE, AND HEATING?: NOT HARD AT ALL

## 2024-05-31 SDOH — ECONOMIC STABILITY: TRANSPORTATION INSECURITY
IN THE PAST 12 MONTHS, HAS LACK OF TRANSPORTATION KEPT YOU FROM MEETINGS, WORK, OR FROM GETTING THINGS NEEDED FOR DAILY LIVING?: NO

## 2024-05-31 SDOH — ECONOMIC STABILITY: FOOD INSECURITY: WITHIN THE PAST 12 MONTHS, YOU WORRIED THAT YOUR FOOD WOULD RUN OUT BEFORE YOU GOT MONEY TO BUY MORE.: NEVER TRUE

## 2024-06-03 ENCOUNTER — OFFICE VISIT (OUTPATIENT)
Dept: OBGYN CLINIC | Age: 34
End: 2024-06-03
Payer: COMMERCIAL

## 2024-06-03 VITALS
BODY MASS INDEX: 28.74 KG/M2 | WEIGHT: 146.4 LBS | SYSTOLIC BLOOD PRESSURE: 106 MMHG | DIASTOLIC BLOOD PRESSURE: 78 MMHG | HEIGHT: 60 IN

## 2024-06-03 DIAGNOSIS — Z01.419 WELL WOMAN EXAM: Primary | ICD-10-CM

## 2024-06-03 DIAGNOSIS — Z13.89 SCREENING FOR GENITOURINARY CONDITION: ICD-10-CM

## 2024-06-03 LAB
BILIRUBIN, URINE, POC: NEGATIVE
BLOOD URINE, POC: NEGATIVE
GLUCOSE URINE, POC: NEGATIVE
KETONES, URINE, POC: NEGATIVE
LEUKOCYTE ESTERASE, URINE, POC: NEGATIVE
NITRITE, URINE, POC: NEGATIVE
PH, URINE, POC: 6 (ref 4.6–8)
PROTEIN,URINE, POC: NEGATIVE
SPECIFIC GRAVITY, URINE, POC: 1.01 (ref 1–1.03)
URINALYSIS CLARITY, POC: CLEAR
URINALYSIS COLOR, POC: NORMAL
UROBILINOGEN, POC: NORMAL

## 2024-06-03 PROCEDURE — 81002 URINALYSIS NONAUTO W/O SCOPE: CPT | Performed by: OBSTETRICS & GYNECOLOGY

## 2024-06-03 PROCEDURE — 99395 PREV VISIT EST AGE 18-39: CPT | Performed by: OBSTETRICS & GYNECOLOGY

## 2024-06-03 ASSESSMENT — PATIENT HEALTH QUESTIONNAIRE - PHQ9
SUM OF ALL RESPONSES TO PHQ9 QUESTIONS 1 & 2: 0
1. LITTLE INTEREST OR PLEASURE IN DOING THINGS: NOT AT ALL
2. FEELING DOWN, DEPRESSED OR HOPELESS: NOT AT ALL
SUM OF ALL RESPONSES TO PHQ QUESTIONS 1-9: 0

## 2024-06-03 NOTE — PROGRESS NOTES
HPI:  Ms. Hale is a 33 y.o. female  who is here today for a well woman exam. She complains of none.  She is tracking her cycle. She may want to have another baby.         Date Performed Result   PAP 02/15/23 Wnl hpv neg   Mammogram never    Colonoscopy never    Dexa never        OB History          1    Para   1    Term   1       0    AB   0    Living   1         SAB        IAB        Ectopic        Molar        Multiple        Live Births   1            Baby is 1yo.    GYN History     Patient's last menstrual period was 2024. Cycle Length 30 Lasting 5  negative dysmenorrhea; negative postcoital bleeding        Past Medical History:   Diagnosis Date    H/O seasonal allergies      No past surgical history on file.    Allergies   Allergen Reactions    Metronidazole Other (See Comments)     Intolerance, not a true allergy per pt.  anxiety    Erythromycin Hives and Nausea And Vomiting       Current Outpatient Medications   Medication Sig Dispense Refill    Multiple Vitamin (MULTIVITAMIN PO) Take by mouth      Ascorbic Acid (VITAMIN C PO) Take by mouth      VITAMIN D PO Take by mouth      Flaxseed, Linseed, (FLAX SEEDS PO) Take by mouth      TURMERIC PO Take by mouth      cetirizine (ZYRTEC) 10 MG tablet Take by mouth       No current facility-administered medications for this visit.         Social History     Socioeconomic History    Marital status:  - 6 years      Spouse name: Not on file    Number of children: Not on file    Years of education: Not on file    Highest education level: Not on file   Occupational History    Not on file   Tobacco Use    Smoking status: Never    Smokeless tobacco: Never   Vaping Use    Vaping Use: Never used   Substance and Sexual Activity    Alcohol use: Not Currently    Drug use: Never    Sexual activity: Yes     Partners: Male     Birth control/protection: None   Other Topics Concern    Not on file   Social History Narrative    Not on file     Social